# Patient Record
Sex: MALE | NOT HISPANIC OR LATINO | Employment: FULL TIME | URBAN - METROPOLITAN AREA
[De-identification: names, ages, dates, MRNs, and addresses within clinical notes are randomized per-mention and may not be internally consistent; named-entity substitution may affect disease eponyms.]

---

## 2023-11-20 ENCOUNTER — OFFICE VISIT (OUTPATIENT)
Dept: UROLOGY | Facility: CLINIC | Age: 33
End: 2023-11-20
Payer: COMMERCIAL

## 2023-11-20 VITALS
HEART RATE: 80 BPM | BODY MASS INDEX: 42.66 KG/M2 | WEIGHT: 315 LBS | SYSTOLIC BLOOD PRESSURE: 145 MMHG | HEIGHT: 72 IN | DIASTOLIC BLOOD PRESSURE: 88 MMHG

## 2023-11-20 DIAGNOSIS — Z30.09 FAMILY PLANNING: Primary | ICD-10-CM

## 2023-11-20 PROCEDURE — 99202 OFFICE O/P NEW SF 15 MIN: CPT | Performed by: UROLOGY

## 2023-11-20 RX ORDER — LOSARTAN POTASSIUM 100 MG/1
100 TABLET ORAL DAILY
COMMUNITY

## 2023-11-20 RX ORDER — DIAZEPAM 5 MG/1
5 TABLET ORAL EVERY 8 HOURS PRN
Qty: 1 TABLET | Refills: 0 | Status: SHIPPED | OUTPATIENT
Start: 2023-11-20 | End: 2023-11-27

## 2023-11-20 NOTE — PROGRESS NOTES
11/20/2023  We discussed scalpeless vasectomy procedure, the indications and potential side effects and complications. Patient expressed understanding and agreed to proceed the procedure.    Plan  Valium 5 mg 1 hour before procedure  Vasectomy    I have personally reviewed the [facility] report for this patient. This report is scanned into the electronic medical record. I have considered the risk of abuse, dependence, addictions and diversion. I believe that it is clinically appropriate for this patient to be prescribed this medication    Chief Complaint   Patient presents with    Sterilization     Patient is here today for a vasectomy  evaluation , he is a new patient to the practice.        Physical Exam     TODAYS LAB RESULTS:      ASSESSMENT&PLAN:      IMPRESSIONS:

## 2025-01-20 ENCOUNTER — OFFICE VISIT (OUTPATIENT)
Dept: URGENT CARE | Age: 35
End: 2025-01-20
Payer: COMMERCIAL

## 2025-01-20 VITALS
HEIGHT: 73 IN | OXYGEN SATURATION: 94 % | RESPIRATION RATE: 17 BRPM | HEART RATE: 79 BPM | BODY MASS INDEX: 41.75 KG/M2 | WEIGHT: 315 LBS | SYSTOLIC BLOOD PRESSURE: 146 MMHG | DIASTOLIC BLOOD PRESSURE: 94 MMHG | TEMPERATURE: 98.1 F

## 2025-01-20 DIAGNOSIS — J02.9 ACUTE PHARYNGITIS, UNSPECIFIED ETIOLOGY: ICD-10-CM

## 2025-01-20 DIAGNOSIS — H92.02 OTALGIA, LEFT: Primary | ICD-10-CM

## 2025-01-20 NOTE — PATIENT INSTRUCTIONS
Please follow up with your primary provider within one week if symptoms do not improve.  You may schedule an appointment online at [email or web address] or call [phone]. Go to the Emergency Department if symptoms significantly worsen    You may use Tylenol or ibuprofen as needed

## 2025-01-20 NOTE — PROGRESS NOTES
"Subjective   Patient ID: Tripp Ramos is a 34 y.o. male. They present today with a chief complaint of Earache (Left ear pain for 2 days.).    History of Present Illness  Reports intermittent sharp pain to the left ear over the last 2 days.  Also notes a dry scratchy throat for the past 2 days.  Denies nasal congestion or drainage, fever, swollen lymph nodes.  No known exposures, change in hearing, ear drainage      Earache         Past Medical History  Allergies as of 01/20/2025    (No Known Allergies)       (Not in a hospital admission)       Past Medical History:   Diagnosis Date    Hypertension        No past surgical history on file.     reports that he has never smoked. He has never used smokeless tobacco. He reports current alcohol use of about 4.0 standard drinks of alcohol per week. He reports that he does not use drugs.    Review of Systems  Pertinent systems reviewed and were negative unless otherwise stated in HPI.    Objective    Vitals:    01/20/25 1302   BP: (!) 146/94   BP Location: Left arm   Patient Position: Sitting   BP Cuff Size: Adult   Pulse: 79   Resp: 17   Temp: 36.7 °C (98.1 °F)   TempSrc: Oral   SpO2: 94%   Weight: (!) 159 kg (350 lb)   Height: 1.854 m (6' 1\")     No LMP for male patient.    Physical Exam  Constitutional:       General: He is not in acute distress.  HENT:      Right Ear: Tympanic membrane, ear canal and external ear normal.      Left Ear: Tympanic membrane, ear canal and external ear normal.      Nose: No congestion.      Mouth/Throat:      Mouth: Mucous membranes are moist.      Pharynx: Posterior oropharyngeal erythema (Mild) present. No oropharyngeal exudate.   Eyes:      Conjunctiva/sclera: Conjunctivae normal.   Cardiovascular:      Rate and Rhythm: Normal rate and regular rhythm.   Pulmonary:      Effort: Pulmonary effort is normal. No respiratory distress.   Lymphadenopathy:      Cervical: No cervical adenopathy.   Skin:     Findings: No rash.         Diagnostic " study results (if any) were reviewed by John Delarosa PA-C.    Assessment/Plan   Allergies, medications, history, and pertinent labs/EKGs/imaging reviewed by John Delarosa PA-C.     Medical Decision Making  Low concern for otitis media or externa, mastoiditis, strep, PTA, TMJ, parotitis    Orders and Diagnoses  Diagnoses and all orders for this visit:  Otalgia, left  Acute pharyngitis, unspecified etiology      Medical Admin Record      Disposition: Home    Electronically signed by John Delarosa PA-C